# Patient Record
Sex: MALE | Race: WHITE | NOT HISPANIC OR LATINO | URBAN - METROPOLITAN AREA
[De-identification: names, ages, dates, MRNs, and addresses within clinical notes are randomized per-mention and may not be internally consistent; named-entity substitution may affect disease eponyms.]

---
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
## 2020-02-23 ENCOUNTER — HEALTH MAINTENANCE LETTER (OUTPATIENT)
Age: 20
End: 2020-02-23

## 2020-12-13 ENCOUNTER — HEALTH MAINTENANCE LETTER (OUTPATIENT)
Age: 20
End: 2020-12-13

## 2021-04-17 ENCOUNTER — HEALTH MAINTENANCE LETTER (OUTPATIENT)
Age: 21
End: 2021-04-17

## 2021-09-26 ENCOUNTER — HEALTH MAINTENANCE LETTER (OUTPATIENT)
Age: 21
End: 2021-09-26

## 2022-05-08 ENCOUNTER — HEALTH MAINTENANCE LETTER (OUTPATIENT)
Age: 22
End: 2022-05-08

## 2023-01-08 ENCOUNTER — HEALTH MAINTENANCE LETTER (OUTPATIENT)
Age: 23
End: 2023-01-08

## 2023-01-25 ENCOUNTER — OFFICE VISIT (OUTPATIENT)
Dept: URGENT CARE | Facility: URGENT CARE | Age: 23
End: 2023-01-25
Payer: COMMERCIAL

## 2023-01-25 VITALS
OXYGEN SATURATION: 97 % | BODY MASS INDEX: 18.8 KG/M2 | SYSTOLIC BLOOD PRESSURE: 120 MMHG | TEMPERATURE: 99.2 F | HEART RATE: 97 BPM | WEIGHT: 120.8 LBS | DIASTOLIC BLOOD PRESSURE: 70 MMHG

## 2023-01-25 DIAGNOSIS — J01.90 ACUTE SINUSITIS WITH SYMPTOMS > 10 DAYS: Primary | ICD-10-CM

## 2023-01-25 PROCEDURE — 99203 OFFICE O/P NEW LOW 30 MIN: CPT | Performed by: FAMILY MEDICINE

## 2023-01-25 RX ORDER — AMOXICILLIN 500 MG/1
1000 CAPSULE ORAL 2 TIMES DAILY
Qty: 40 CAPSULE | Refills: 0 | Status: SHIPPED | OUTPATIENT
Start: 2023-01-25 | End: 2023-02-04

## 2023-01-25 NOTE — PROGRESS NOTES
Chief complaint: sinus symptoms    3-4 weeks ago had sore throat  And since then has had runny nose and congestion  But worsened these past 10 days congested in the nose  No fevers  Did a few at home covid tests and was negative   Colds, sinus congestion,  No facial pain   Cough Yes   No fever  No chest pain or shortness of breath      Problem list and histories reviewed & adjusted, as indicated.  Additional history: as documented    Problem list, Medication list, Allergies, and Medical/Social/Surgical histories reviewed in [facility] and updated as appropriate.    ROS:  Constitutional, HEENT, cardiovascular, pulmonary, gi and gu systems are negative, except as otherwise noted.    OBJECTIVE:                                                    BP (!) 142/85   Pulse 97   Temp 99.2  F (37.3  C) (Tympanic)   Wt 54.8 kg (120 lb 12.8 oz)   SpO2 97%   BMI 18.80 kg/m    Body mass index is 18.8 kg/m .   /70   Pulse 97   Temp 99.2  F (37.3  C) (Tympanic)   Wt 54.8 kg (120 lb 12.8 oz)   SpO2 97%   BMI 18.80 kg/m      GENERAL: healthy, alert and no distress  EYES: Eyes grossly normal to inspection, PERRL and conjunctivae and sclerae normal  HENT: ear canals and TM's normal, nose and mouth without ulcers or lesions  Sinuses: turbinates erythematous   NECK: no adenopathy, no asymmetry, masses, or scars and thyroid normal to palpation  RESP: lungs clear to auscultation - no rales, rhonchi or wheezes   CV: regular rate and rhythm, normal S1 S2, no S3 or S4, no murmur, click or rub, no peripheral edema and peripheral pulses strong  MS: no gross musculoskeletal defects noted, no edema  SKIN: no suspicious lesions or rashes  NEURO: Normal strength and tone, mentation intact and speech normal  PSYCH: mentation appears normal, affect normal/bright    Diagnostic Test Results:  No results found for this or any previous visit (from the past 24 hour(s)).     ASSESSMENT/PLAN:                                                    No  diagnosis found.    ICD-10-CM    1. Acute sinusitis with symptoms > 10 days  J01.90 amoxicillin (AMOXIL) 500 MG capsule          Recommend follow up with primary care provider if no relief, sooner if worse  Adverse reactions of medications discussed.  Over the counter medications discussed.   Aware to come back in if with worsening symptoms or if no relief despite treatment plan  Patient voiced understanding and had no further questions.     MD Kenia Mancera MD  [facility] [place]

## 2023-06-02 ENCOUNTER — HEALTH MAINTENANCE LETTER (OUTPATIENT)
Age: 23
End: 2023-06-02

## 2024-06-30 ENCOUNTER — HEALTH MAINTENANCE LETTER (OUTPATIENT)
Age: 24
End: 2024-06-30

## 2025-03-03 ENCOUNTER — OFFICE VISIT (OUTPATIENT)
Dept: URGENT CARE | Facility: URGENT CARE | Age: 25
End: 2025-03-03
Payer: COMMERCIAL

## 2025-03-03 VITALS
OXYGEN SATURATION: 99 % | HEART RATE: 120 BPM | RESPIRATION RATE: 18 BRPM | SYSTOLIC BLOOD PRESSURE: 137 MMHG | DIASTOLIC BLOOD PRESSURE: 84 MMHG | TEMPERATURE: 98.3 F | BODY MASS INDEX: 19.05 KG/M2 | WEIGHT: 122.4 LBS

## 2025-03-03 DIAGNOSIS — R42 SPELL OF DIZZINESS: Primary | ICD-10-CM

## 2025-03-03 PROCEDURE — 3075F SYST BP GE 130 - 139MM HG: CPT | Performed by: FAMILY MEDICINE

## 2025-03-03 PROCEDURE — 3079F DIAST BP 80-89 MM HG: CPT | Performed by: FAMILY MEDICINE

## 2025-03-03 PROCEDURE — 99213 OFFICE O/P EST LOW 20 MIN: CPT | Performed by: FAMILY MEDICINE

## 2025-03-03 NOTE — PROGRESS NOTES
(R42) Spell of dizziness  (primary encounter diagnosis)  Comment:     Brief spell of dizziness/near fainting previous afternoon.  Cause may be multifactorial such as diminished food intake, use of cold medications, vaping.  Seems to be doing fine today.    Plan:   Discussed above concerns.  Suggested manage expectantly.  Come in for additional workup if he has repeated episodes.      CHIEF COMPLAINT    Dizzy spell.      HISTORY    This is a thirteen 24-year-old young man who yesterday evening had a spell where he became dizzy and diaphoretic and felt like he might faint and this lasted several minutes.  He seemed to improve after sitting down for 10 or 15 minutes.    Context is that he had a cold for the previous week.  He took some cold medicine.  He is oral intake is diminished.  Right now with the symptoms seem to be improved.  Last night he was just beginning to eat more vigorously.    He has no known medical problems.  He is on no current medications other than the OTC cold remedies.    He does not smoke cigarettes but he does vape regularly.      REVIEW OF SYSTEMS    No fever or chills.  No congestion or sore throat presently.  No breathing difficulty or cough.  No chest pain, palpitations, edema.  No nausea or abdominal pain.  No localized weakness.  No tremor.        EXAM  /84   Pulse 120   Temp 98.3  F (36.8  C) (Oral)   Resp 18   Wt 55.5 kg (122 lb 6.4 oz)   SpO2 99%   BMI 19.05 kg/m      Patient is thin, appears well in general.  HEENT shows normal pupils, tympanic membranes, pharynx.  Neck no mass or thyromegaly.  Chest clear  Cardiac RSR without murmur   Abdomen negative  Speech, motor, gait WNL.

## 2025-07-13 ENCOUNTER — HEALTH MAINTENANCE LETTER (OUTPATIENT)
Age: 25
End: 2025-07-13